# Patient Record
Sex: MALE | Race: WHITE | NOT HISPANIC OR LATINO | URBAN - METROPOLITAN AREA
[De-identification: names, ages, dates, MRNs, and addresses within clinical notes are randomized per-mention and may not be internally consistent; named-entity substitution may affect disease eponyms.]

---

## 2017-01-25 ENCOUNTER — FOLLOW UP ESTABLISHED (OUTPATIENT)
Dept: URBAN - METROPOLITAN AREA CLINIC 44 | Facility: CLINIC | Age: 82
End: 2017-01-25
Payer: MEDICARE

## 2017-01-25 PROCEDURE — 76514 ECHO EXAM OF EYE THICKNESS: CPT | Performed by: OPTOMETRIST

## 2017-01-25 PROCEDURE — 92012 INTRM OPH EXAM EST PATIENT: CPT | Performed by: OPTOMETRIST

## 2017-01-25 PROCEDURE — 92083 EXTENDED VISUAL FIELD XM: CPT | Performed by: OPTOMETRIST

## 2017-01-25 ASSESSMENT — INTRAOCULAR PRESSURE
OD: 18
OS: 19

## 2017-09-27 ENCOUNTER — FOLLOW UP ESTABLISHED (OUTPATIENT)
Dept: URBAN - METROPOLITAN AREA CLINIC 44 | Facility: CLINIC | Age: 82
End: 2017-09-27
Payer: MEDICARE

## 2017-09-27 DIAGNOSIS — G43.B1 OPHTHALMOPLEGIC MIGRAINE, INTRACTABLE: ICD-10-CM

## 2017-09-27 DIAGNOSIS — H40.013 OPEN ANGLE WITH BORDERLINE FINDINGS, LOW RISK, BILATERAL: ICD-10-CM

## 2017-09-27 DIAGNOSIS — H25.13 AGE-RELATED NUCLEAR CATARACT, BILATERAL: Primary | ICD-10-CM

## 2017-09-27 DIAGNOSIS — H43.811 VITREOUS DEGENERATION, RIGHT EYE: ICD-10-CM

## 2017-09-27 PROCEDURE — 92014 COMPRE OPH EXAM EST PT 1/>: CPT | Performed by: OPTOMETRIST

## 2017-09-27 PROCEDURE — 92082 INTERMEDIATE VISUAL FIELD XM: CPT | Performed by: OPTOMETRIST

## 2017-09-27 PROCEDURE — 92133 CPTRZD OPH DX IMG PST SGM ON: CPT | Performed by: OPTOMETRIST

## 2017-09-27 ASSESSMENT — KERATOMETRY
OS: 44.63
OD: 43.88

## 2017-09-27 ASSESSMENT — INTRAOCULAR PRESSURE
OS: 15
OD: 14

## 2022-10-25 ENCOUNTER — OFFICE VISIT (OUTPATIENT)
Dept: URBAN - METROPOLITAN AREA CLINIC 44 | Facility: CLINIC | Age: 87
End: 2022-10-25
Payer: COMMERCIAL

## 2022-10-25 DIAGNOSIS — H04.123 TEAR FILM INSUFFICIENCY OF BILATERAL LACRIMAL GLANDS: ICD-10-CM

## 2022-10-25 DIAGNOSIS — H40.013 OPEN ANGLE WITH BORDERLINE FINDINGS, LOW RISK, BILATERAL: Primary | ICD-10-CM

## 2022-10-25 DIAGNOSIS — H43.813 VITREOUS DEGENERATION, BILATERAL: ICD-10-CM

## 2022-10-25 DIAGNOSIS — H25.13 AGE-RELATED NUCLEAR CATARACT, BILATERAL: ICD-10-CM

## 2022-10-25 DIAGNOSIS — H11.32 SUBCONJUNCTIVAL HEMORRHAGE OF LEFT EYE: ICD-10-CM

## 2022-10-25 PROCEDURE — 92004 COMPRE OPH EXAM NEW PT 1/>: CPT | Performed by: OPTOMETRIST

## 2022-10-25 PROCEDURE — 92133 CPTRZD OPH DX IMG PST SGM ON: CPT | Performed by: OPTOMETRIST

## 2022-10-25 ASSESSMENT — VISUAL ACUITY
OS: 20/25
OD: 20/20

## 2022-10-25 ASSESSMENT — INTRAOCULAR PRESSURE
OS: 16
OD: 15

## 2022-10-25 ASSESSMENT — KERATOMETRY
OD: 43.88
OS: 44.63

## 2022-10-25 NOTE — IMPRESSION/PLAN
Impression: Age-related nuclear cataract, bilateral
 Patient asymptomatic and comfortable with current level of vision. Plan: PLAN: Continue to observe condition. RTC 12 months for complete exam complete + OCT (Mac) to reaccessed cataracts. RTC sooner if patient symptoms become worse.

## 2022-10-25 NOTE — IMPRESSION/PLAN
Impression: Open angle with borderline findings, low risk, bilateral
=IOP OD 15, OS 16
-Vertical cupping OD .36, OS .62. Normal RNFL OU. Small disc OU. Average OD 97 OS 77
-HVF (01/25/17): scattered defects; reliable OU. Pach 533/525. -Fm Hx. Plan: PLAN: Cont no gtts today.  RTC 12 months for complete + RNFL

## 2023-01-20 ENCOUNTER — OFFICE VISIT (OUTPATIENT)
Dept: URBAN - METROPOLITAN AREA CLINIC 44 | Facility: CLINIC | Age: 88
End: 2023-01-20
Payer: COMMERCIAL

## 2023-01-20 DIAGNOSIS — H52.13 MYOPIA, BILATERAL: Primary | ICD-10-CM

## 2023-01-20 ASSESSMENT — VISUAL ACUITY
OS: 20/25
OD: 20/20

## 2023-04-03 ENCOUNTER — OFFICE VISIT (OUTPATIENT)
Dept: URBAN - METROPOLITAN AREA CLINIC 44 | Facility: CLINIC | Age: 88
End: 2023-04-03
Payer: COMMERCIAL

## 2023-04-03 DIAGNOSIS — H40.013 OPEN ANGLE WITH BORDERLINE FINDINGS, LOW RISK, BILATERAL: Primary | ICD-10-CM

## 2023-04-03 DIAGNOSIS — H25.13 AGE-RELATED NUCLEAR CATARACT, BILATERAL: ICD-10-CM

## 2023-04-03 DIAGNOSIS — H04.123 TEAR FILM INSUFFICIENCY OF BILATERAL LACRIMAL GLANDS: ICD-10-CM

## 2023-04-03 DIAGNOSIS — H43.813 VITREOUS DEGENERATION, BILATERAL: ICD-10-CM

## 2023-04-03 DIAGNOSIS — H52.13 MYOPIA, BILATERAL: ICD-10-CM

## 2023-04-03 PROCEDURE — 92014 COMPRE OPH EXAM EST PT 1/>: CPT | Performed by: OPTOMETRIST

## 2023-04-03 PROCEDURE — 92015 DETERMINE REFRACTIVE STATE: CPT | Performed by: OPTOMETRIST

## 2023-04-03 ASSESSMENT — INTRAOCULAR PRESSURE
OS: 16
OD: 14

## 2023-04-03 ASSESSMENT — KERATOMETRY
OD: 43.88
OS: 44.88

## 2023-04-03 ASSESSMENT — VISUAL ACUITY
OS: 20/25
OD: 20/25

## 2023-04-03 NOTE — IMPRESSION/PLAN
Impression: Tear film insufficiency of bilateral lacrimal glands: H04.123. Clinical evaluation shows mild DED signs. Patient reports no or occasional symptoms not interfering with daily activities. Plan: PLAN: Recommend Lipid based tears to be used 3-4X daily if symptomatic. PRN if no symptoms. Observe condition and RTC if symptom's worsen.

## 2023-04-03 NOTE — IMPRESSION/PLAN
Impression: Open angle with borderline findings, low risk, bilateral
=IOP OD 14, OS 16
-Vertical cupping OD .36, OS .62. Normal RNFL OU. Small disc OU. Average OD 97 OS 77
-HVF (01/25/17): scattered defects; reliable OU. -Pach 533/525. -Fm Hx. Plan: PLAN: Cont no gtts today.  RTC 12 months for complete + RNFL

## 2023-04-03 NOTE — IMPRESSION/PLAN
Impression: Age-related nuclear cataract, bilateral
 Patient asymptomatic and comfortable with current level of vision. Plan: PLAN:  RTC 12 months for complete exam complete + OCT (Mac) to reaccessed cataracts. RTC sooner if patient symptoms become worse.

## 2024-04-24 ENCOUNTER — OFFICE VISIT (OUTPATIENT)
Dept: URBAN - METROPOLITAN AREA CLINIC 44 | Facility: CLINIC | Age: 89
End: 2024-04-24
Payer: MEDICARE

## 2024-04-24 DIAGNOSIS — H43.813 VITREOUS DEGENERATION, BILATERAL: ICD-10-CM

## 2024-04-24 DIAGNOSIS — H25.13 AGE-RELATED NUCLEAR CATARACT, BILATERAL: Primary | ICD-10-CM

## 2024-04-24 DIAGNOSIS — H04.123 TEAR FILM INSUFFICIENCY OF BILATERAL LACRIMAL GLANDS: ICD-10-CM

## 2024-04-24 DIAGNOSIS — H40.013 OPEN ANGLE WITH BORDERLINE FINDINGS, LOW RISK, BILATERAL: ICD-10-CM

## 2024-04-24 PROCEDURE — 92014 COMPRE OPH EXAM EST PT 1/>: CPT | Performed by: OPTOMETRIST

## 2024-04-24 PROCEDURE — 92133 CPTRZD OPH DX IMG PST SGM ON: CPT | Performed by: OPTOMETRIST

## 2024-04-24 ASSESSMENT — VISUAL ACUITY
OD: 20/20
OS: 20/25

## 2024-04-24 ASSESSMENT — INTRAOCULAR PRESSURE
OS: 17
OD: 16

## 2024-10-02 ENCOUNTER — OFFICE VISIT (OUTPATIENT)
Dept: URBAN - METROPOLITAN AREA CLINIC 44 | Facility: CLINIC | Age: 89
End: 2024-10-02
Payer: COMMERCIAL

## 2024-10-02 DIAGNOSIS — H43.813 VITREOUS DEGENERATION, BILATERAL: Primary | ICD-10-CM

## 2024-10-02 DIAGNOSIS — H04.123 TEAR FILM INSUFFICIENCY OF BILATERAL LACRIMAL GLANDS: ICD-10-CM

## 2024-10-02 DIAGNOSIS — H25.813 COMBINED FORMS OF AGE-RELATED CATARACT, BILATERAL: ICD-10-CM

## 2024-10-02 PROCEDURE — 92004 COMPRE OPH EXAM NEW PT 1/>: CPT | Performed by: OPTOMETRIST

## 2024-10-02 ASSESSMENT — VISUAL ACUITY
OD: 20/20
OS: 20/25

## 2024-10-02 ASSESSMENT — KERATOMETRY
OD: 44.00
OS: 44.75

## 2024-10-02 ASSESSMENT — INTRAOCULAR PRESSURE
OS: 20
OD: 19

## 2025-04-09 ENCOUNTER — OFFICE VISIT (OUTPATIENT)
Dept: URBAN - METROPOLITAN AREA CLINIC 44 | Facility: CLINIC | Age: 89
End: 2025-04-09
Payer: MEDICARE

## 2025-04-09 DIAGNOSIS — H04.123 TEAR FILM INSUFFICIENCY OF BILATERAL LACRIMAL GLANDS: ICD-10-CM

## 2025-04-09 DIAGNOSIS — H43.813 VITREOUS DEGENERATION, BILATERAL: ICD-10-CM

## 2025-04-09 DIAGNOSIS — H25.813 COMBINED FORMS OF AGE-RELATED CATARACT, BILATERAL: ICD-10-CM

## 2025-04-09 DIAGNOSIS — H40.013 OPEN ANGLE WITH BORDERLINE FINDINGS, LOW RISK, BILATERAL: Primary | ICD-10-CM

## 2025-04-09 PROCEDURE — 92133 CPTRZD OPH DX IMG PST SGM ON: CPT | Performed by: OPTOMETRIST

## 2025-04-09 PROCEDURE — 92014 COMPRE OPH EXAM EST PT 1/>: CPT | Performed by: OPTOMETRIST

## 2025-04-09 ASSESSMENT — VISUAL ACUITY
OS: 20/30
OD: 20/20

## 2025-04-09 ASSESSMENT — KERATOMETRY: OS: 44.75

## 2025-04-09 ASSESSMENT — INTRAOCULAR PRESSURE
OS: 20
OD: 16

## 2025-04-15 ENCOUNTER — TECH ONLY (OUTPATIENT)
Dept: URBAN - METROPOLITAN AREA CLINIC 44 | Facility: CLINIC | Age: 89
End: 2025-04-15
Payer: MEDICARE

## 2025-04-15 DIAGNOSIS — H25.813 COMBINED FORMS OF AGE-RELATED CATARACT, BILATERAL: ICD-10-CM

## 2025-04-15 DIAGNOSIS — Z01.818 ENCOUNTER FOR OTHER PREPROCEDURAL EXAMINATION: Primary | ICD-10-CM

## 2025-04-15 PROCEDURE — 99203 OFFICE O/P NEW LOW 30 MIN: CPT | Performed by: PHYSICIAN ASSISTANT

## 2025-04-15 ASSESSMENT — PACHYMETRY
OD: 3.52
OS: 25.01
OD: 25.12
OS: 3.42

## 2025-04-21 ENCOUNTER — OFFICE VISIT (OUTPATIENT)
Dept: URBAN - METROPOLITAN AREA CLINIC 44 | Facility: CLINIC | Age: 89
End: 2025-04-21
Payer: MEDICARE

## 2025-04-21 DIAGNOSIS — H43.813 VITREOUS DEGENERATION, BILATERAL: ICD-10-CM

## 2025-04-21 DIAGNOSIS — H52.223 REGULAR ASTIGMATISM, BILATERAL: ICD-10-CM

## 2025-04-21 DIAGNOSIS — H40.013 OPEN ANGLE WITH BORDERLINE FINDINGS, LOW RISK, BILATERAL: ICD-10-CM

## 2025-04-21 DIAGNOSIS — H04.123 TEAR FILM INSUFFICIENCY OF BILATERAL LACRIMAL GLANDS: Primary | ICD-10-CM

## 2025-04-21 DIAGNOSIS — H25.813 COMBINED FORMS OF AGE-RELATED CATARACT, BILATERAL: ICD-10-CM

## 2025-04-21 PROCEDURE — 99204 OFFICE O/P NEW MOD 45 MIN: CPT | Performed by: OPHTHALMOLOGY

## 2025-04-21 ASSESSMENT — INTRAOCULAR PRESSURE
OS: 21
OD: 19

## 2025-04-30 ENCOUNTER — SURGERY (OUTPATIENT)
Dept: URBAN - METROPOLITAN AREA SURGERY 19 | Facility: SURGERY | Age: 89
End: 2025-04-30
Payer: MEDICARE

## 2025-04-30 PROCEDURE — 66984 XCAPSL CTRC RMVL W/O ECP: CPT | Performed by: OPHTHALMOLOGY

## 2025-04-30 RX ORDER — OFLOXACIN 3 MG/ML
0.3 % SOLUTION/ DROPS OPHTHALMIC
Qty: 1 | Refills: 0 | Status: ACTIVE
Start: 2025-04-30

## 2025-05-01 ENCOUNTER — POST-OPERATIVE VISIT (OUTPATIENT)
Dept: URBAN - METROPOLITAN AREA CLINIC 44 | Facility: CLINIC | Age: 89
End: 2025-05-01
Payer: MEDICARE

## 2025-05-01 DIAGNOSIS — Z96.1 PRESENCE OF INTRAOCULAR LENS: Primary | ICD-10-CM

## 2025-05-01 PROCEDURE — 99024 POSTOP FOLLOW-UP VISIT: CPT | Performed by: OPTOMETRIST

## 2025-05-01 ASSESSMENT — INTRAOCULAR PRESSURE: OS: 22

## 2025-05-29 ENCOUNTER — POST-OPERATIVE VISIT (OUTPATIENT)
Dept: URBAN - METROPOLITAN AREA CLINIC 44 | Facility: CLINIC | Age: 89
End: 2025-05-29
Payer: MEDICARE

## 2025-05-29 DIAGNOSIS — Z48.810 ENCOUNTER FOR SURGICAL AFTERCARE FOLLOWING SURGERY ON A SENSE ORGAN: ICD-10-CM

## 2025-05-29 DIAGNOSIS — H52.13 MYOPIA, BILATERAL: Primary | ICD-10-CM

## 2025-05-29 PROCEDURE — 92015 DETERMINE REFRACTIVE STATE: CPT | Performed by: OPTOMETRIST

## 2025-05-29 PROCEDURE — 99024 POSTOP FOLLOW-UP VISIT: CPT | Performed by: OPTOMETRIST

## 2025-05-29 ASSESSMENT — VISUAL ACUITY
OS: 20/25
OD: 20/25

## 2025-05-29 ASSESSMENT — KERATOMETRY
OD: 44.13
OD: 59.75

## 2025-05-29 ASSESSMENT — INTRAOCULAR PRESSURE
OS: 21
OD: 18

## 2025-06-26 ENCOUNTER — POST-OPERATIVE VISIT (OUTPATIENT)
Dept: URBAN - METROPOLITAN AREA CLINIC 44 | Facility: CLINIC | Age: 89
End: 2025-06-26
Payer: MEDICARE

## 2025-06-26 DIAGNOSIS — Z48.810 ENCOUNTER FOR SURGICAL AFTERCARE FOLLOWING SURGERY ON A SENSE ORGAN: ICD-10-CM

## 2025-06-26 DIAGNOSIS — H52.13 MYOPIA, BILATERAL: Primary | ICD-10-CM

## 2025-06-26 PROCEDURE — 99024 POSTOP FOLLOW-UP VISIT: CPT | Performed by: OPTOMETRIST

## 2025-06-26 PROCEDURE — 92015 DETERMINE REFRACTIVE STATE: CPT | Performed by: OPTOMETRIST

## 2025-06-26 ASSESSMENT — KERATOMETRY
OD: 44.00
OS: 44.88

## 2025-06-26 ASSESSMENT — VISUAL ACUITY
OD: 20/25
OS: 20/30

## 2025-06-26 ASSESSMENT — INTRAOCULAR PRESSURE
OD: 18
OS: 19

## 2025-08-06 ENCOUNTER — POST-OPERATIVE VISIT (OUTPATIENT)
Dept: URBAN - METROPOLITAN AREA CLINIC 44 | Facility: CLINIC | Age: 89
End: 2025-08-06
Payer: MEDICARE

## 2025-08-06 DIAGNOSIS — H52.13 MYOPIA, BILATERAL: Primary | ICD-10-CM

## 2025-08-06 DIAGNOSIS — Z96.1 PRESENCE OF INTRAOCULAR LENS: ICD-10-CM

## 2025-08-06 PROCEDURE — 99024 POSTOP FOLLOW-UP VISIT: CPT | Performed by: OPTOMETRIST

## 2025-08-06 ASSESSMENT — VISUAL ACUITY
OD: 20/25
OS: 20/25

## 2025-08-06 ASSESSMENT — KERATOMETRY
OD: 44.38
OS: 44.75

## 2025-08-06 ASSESSMENT — INTRAOCULAR PRESSURE
OD: 16
OS: 16